# Patient Record
Sex: MALE | Race: WHITE | Employment: OTHER | URBAN - METROPOLITAN AREA
[De-identification: names, ages, dates, MRNs, and addresses within clinical notes are randomized per-mention and may not be internally consistent; named-entity substitution may affect disease eponyms.]

---

## 2019-03-27 ENCOUNTER — OFFICE VISIT (OUTPATIENT)
Dept: ORTHOPEDIC SURGERY | Age: 84
End: 2019-03-27
Payer: MEDICARE

## 2019-03-27 VITALS — WEIGHT: 220.02 LBS | BODY MASS INDEX: 30.8 KG/M2 | HEIGHT: 71 IN

## 2019-03-27 DIAGNOSIS — M25.562 PAIN IN BOTH KNEES, UNSPECIFIED CHRONICITY: Primary | ICD-10-CM

## 2019-03-27 DIAGNOSIS — M25.561 PAIN IN BOTH KNEES, UNSPECIFIED CHRONICITY: Primary | ICD-10-CM

## 2019-03-27 PROCEDURE — 99204 OFFICE O/P NEW MOD 45 MIN: CPT | Performed by: ORTHOPAEDIC SURGERY

## 2019-03-27 RX ORDER — LISINOPRIL 20 MG/1
20 TABLET ORAL 2 TIMES DAILY
COMMUNITY
Start: 2019-02-18

## 2019-03-27 RX ORDER — ATORVASTATIN CALCIUM 10 MG/1
10 TABLET, FILM COATED ORAL DAILY
COMMUNITY
Start: 2019-01-15

## 2019-04-01 NOTE — PROGRESS NOTES
MD Pj Nicole, [place]         Orthopaedic Surgery and Sports Medicine      Patient Name: Tricia Mora  YOB: 1934   Patient's PCP is Jolynn Diggs    SUBJECTIVE  Chief Complaint:  Knee Pain (both knees: pain in the knee joints, some pain travelling up his thighs)      History of Present Illness:  Tricia Mora is a 80 y.o. male here regarding bilateral knee pain. The patient is currently ambulating the assistance of a walker. The pain began many years ago. There was not a history of injury. History of swelling: Yes  History of \"giving way\": No  History of instability: Yes  History of popping and/or catching: Yes    The pain is located global.   The patient describes the symptoms as aching. He rates pain at 5/10. Symptoms improve with rest.   The symptoms are made worse with activity. Sleep pattern is affected by the chief complaint: No    The patient has not had PT. The patient has not had an injection. The patient has taken NSAIDs. The patient is not working. Pain Assessment:  Pain Assessment  Location of Pain: Knee  Location Modifiers: Right, Left  Severity of Pain: 0(pain is worse on different occassion)  Quality of Pain: Throbbing, Sharp, Aching, Locking  Duration of Pain: Persistent  Frequency of Pain: Intermittent  Aggravating Factors: Standing, Walking, Stairs, Exercise  Relieving Factors: Exercise    Past Medical History:  No past medical history on file. Past Surgical History:  No past surgical history on file.     Allergies:  No Known Allergies    Medications:  Current Outpatient Medications   Medication Sig Dispense Refill    atorvastatin (LIPITOR) 10 MG tablet Take 10 mg by mouth daily      lisinopril (PRINIVIL;ZESTRIL) 20 MG tablet Take 20 mg by mouth 2 times daily       No current facility-administered medications for this of motion is entirely secondary to his degenerative joint disease. Left knee Special Tests:  Lachman test: negative       Anterior drawer: negative       Posterior drawer: negative       Edi's test: not tested       Varus laxity at 30 degrees: negative       Valgus laxity at 30 degrees: positive    Strength: mild gross motor weakness noted. Motor exam of the lower extremities show quadriceps, hamstrings, foot dorsiflexion and plantarflexion grossly intact. Neurologic & vascular: Sensation to both feet is grossly intact to light touch. The bilateral lower extremities are warm and well-perfused with brisk capillary refill. Additional Examinations:  Right Lower Extremity: Examination of the right lower extremity does not show any tenderness. As stated above his right knee range of motion is certainly better than the left. He does have obvious degenerative joint disease of the right knee as well just not quite as severe. His alignment is a little bit better than the left and there is no effusion. His distal neurologic and circulatory status is normal.    DIAGNOSTICS  Xrays obtained in office today: Yes  Xrays reviewed today: Yes  4 views of the left knee in 2 views of his right knee  Fracture: No  Dislocation: No  Knee joint arthritis: severe, bilateral knees  Medial compartment: severe, bilateral  Lateral compartment: severe bilateral  Patellofemoral compartment: severe bilateral  Patellar tilt:No  Varus deformity: Yes  Valgus deformity:No           ASSESSMENT (Medical Decision Making)    Frandy Bellamy is a 80 y.o. male with the following diagnosis: bilateral severe degenerative osteoarthritis of both knees      ICD-10-CM    1.  Pain in both knees, unspecified chronicity M25.561 XR KNEE RIGHT (MIN 4 VIEWS)    M25.562 XR KNEE LEFT (1-2 VIEWS)       His overall course is unchanged despite conservative treatment      PLAN (Medical Decision Making)  Office Procedures:  Orders Placed This Encounter Procedures    XR KNEE RIGHT (MIN 4 VIEWS)     Standing Status:   Future     Number of Occurrences:   1     Standing Expiration Date:   3/27/2020    XR KNEE LEFT (1-2 VIEWS)     Standing Status:   Future     Number of Occurrences:   1     Standing Expiration Date:   3/27/2020       Treatment Plan:    I discussed the diagnosis and treatment options with Raul Cisneros today. Today for discussion about his degenerative osteoarthritis. He's not interested in any type of surgical intervention because of previous medical problems. I think that's appropriate decision. He has pretty severe degenerative osteoarthritis in both knees. Currently seems to have more symptoms in the left but radiographically probably a little worse on the right. He currently ambulates with the assistance of a walker. I discussed with him the importance of using the walker. Because of his arthritic conditions, a generalized weakness, and his gait, he is at high risk for falling and therefore must always use his walker. I spoke specifically about exercise and trying to walk more frequently to essentially practice his gait and practices ability to walk and to maintain his strength if not even improve upon his strength. I don't believe any specific medications injections or surgical intervention is indicated. Patient was asked to follow-up as needed  Non-steroidal anti-inflammatories medications (NSAIDs) can be used to assist with pain control and to reduce inflammatory changes. These medications may be over-the-counter or prescribed. We discussed taking the NSAID properly and the precautions. The patient understands that this medication may potentially interfere with other medications. Patient was also instructed to immediately discontinue the medication is there is any possible complication.       Raul Cisneros was instructed to call the office if his symptoms worsen or if new symptoms appear prior to the next scheduled visit. He is specifically instructed to contact the office between now and schedule appointment if he has concerns related to his condition or if he needs assistance in scheduling any above tests. He is welcome to call for an appointment sooner if he has any additional concerns or questions. This dictation was performed with a verbal recognition program (DRAGON) and it was checked for errors. It is possible that there are still dictated errors within this office note. If so, please bring any errors to my attention for an addendum. All efforts were made to ensure that this office note is accurate.

## 2023-12-20 NOTE — PROGRESS NOTES
to be held pre-operatively.  Will follow [facility] result's     Follow up with Dr. Young.  Follow up for procedure    Harvey Marie MD  Surgery Attending

## 2023-12-21 PROBLEM — M19.90 OSTEOARTHROSIS: Status: ACTIVE | Noted: 2023-12-21

## 2023-12-21 PROBLEM — I10 HTN (HYPERTENSION), BENIGN: Status: ACTIVE | Noted: 2022-07-12

## 2023-12-21 PROBLEM — E78.2 MIXED HYPERLIPIDEMIA: Status: ACTIVE | Noted: 2022-07-12

## 2023-12-21 PROBLEM — I71.40 AAA (ABDOMINAL AORTIC ANEURYSM) WITHOUT RUPTURE (HCC): Status: ACTIVE | Noted: 2023-12-21

## 2023-12-21 PROBLEM — I48.20 CHRONIC ATRIAL FIBRILLATION (HCC): Status: ACTIVE | Noted: 2023-02-22

## 2023-12-21 PROBLEM — N17.9 ACUTE RENAL FAILURE SUPERIMPOSED ON STAGE 3A CHRONIC KIDNEY DISEASE (HCC): Status: ACTIVE | Noted: 2021-12-29

## 2023-12-21 PROBLEM — N18.30 STAGE 3 CHRONIC KIDNEY DISEASE (HCC): Status: ACTIVE | Noted: 2021-04-30

## 2023-12-21 PROBLEM — E11.21 DIABETIC NEPHROPATHY ASSOCIATED WITH TYPE 2 DIABETES MELLITUS (HCC): Status: ACTIVE | Noted: 2017-07-25

## 2023-12-21 PROBLEM — N18.31 ACUTE RENAL FAILURE SUPERIMPOSED ON STAGE 3A CHRONIC KIDNEY DISEASE (HCC): Status: ACTIVE | Noted: 2021-12-29

## 2023-12-21 PROBLEM — E08.21 DIABETES DUE TO UNDERLYING CONDITION W DIABETIC NEPHROPATHY (HCC): Status: ACTIVE | Noted: 2017-07-25

## 2023-12-21 PROBLEM — E11.9 TYPE 2 DIABETES, DIET CONTROLLED (HCC): Status: ACTIVE | Noted: 2017-07-25

## 2023-12-21 PROBLEM — R60.0 BILATERAL LOWER EXTREMITY EDEMA: Status: ACTIVE | Noted: 2017-09-15

## 2023-12-21 RX ORDER — VITAMIN B COMPLEX
1000 TABLET ORAL 2 TIMES DAILY
COMMUNITY

## 2023-12-21 RX ORDER — HYDROCHLOROTHIAZIDE 25 MG/1
25 TABLET ORAL EVERY MORNING
COMMUNITY
Start: 2023-08-11

## 2023-12-21 RX ORDER — AMLODIPINE BESYLATE 10 MG/1
1 TABLET ORAL DAILY
COMMUNITY
Start: 2023-10-26

## 2023-12-21 RX ORDER — APIXABAN 5 MG/1
5 TABLET, FILM COATED ORAL 2 TIMES DAILY
COMMUNITY

## 2023-12-21 RX ORDER — BIFIDOBACTERIUM LONGUM SUBSP. INFANTIS, AVOBENZONE, HOMOSALATE, OCTISALATE, OCTOCRYLENE, AND OXYBENZONE
KIT
COMMUNITY

## 2023-12-21 RX ORDER — VIT A/VIT C/VIT E/ZINC/COPPER 4296-226
1 CAPSULE ORAL 2 TIMES DAILY
COMMUNITY

## 2023-12-21 RX ORDER — LEVOTHYROXINE SODIUM 0.05 MG/1
50 TABLET ORAL DAILY
COMMUNITY
Start: 2023-09-08

## 2024-01-02 ENCOUNTER — TELEPHONE (OUTPATIENT)
Dept: SURGERY | Age: 89
End: 2024-01-02

## 2024-01-02 NOTE — TELEPHONE ENCOUNTER
Called Mingo read voicemail informing him his appointment is for a consultation and as long as he's feeling well it's OK to come for appointment.     While I was leaving message, Mingo answered phone but stated he was unable to hear me speaking. He stated he would listen to the message and call back.

## 2024-01-02 NOTE — TELEPHONE ENCOUNTER
Patent called in stating he is on an antibiotic for fluid on his lungs. He also take eliquis daily. He is take amoxicillin and will have 3 days left on his appointment on Friday. He wanted to call and give the office a heads up.

## 2024-01-02 NOTE — TELEPHONE ENCOUNTER
Mingo returned call and verified he will come to 10am appt on Friday.   Verbalized understanding that it is for consultation.

## 2024-01-04 RX ORDER — AMOXICILLIN AND CLAVULANATE POTASSIUM 875; 125 MG/1; MG/1
1 TABLET, FILM COATED ORAL 2 TIMES DAILY
COMMUNITY
Start: 2023-12-27

## 2024-01-05 ENCOUNTER — OFFICE VISIT (OUTPATIENT)
Dept: SURGERY | Age: 89
End: 2024-01-05

## 2024-01-05 VITALS
BODY MASS INDEX: 29.15 KG/M2 | TEMPERATURE: 99.4 F | HEART RATE: 61 BPM | SYSTOLIC BLOOD PRESSURE: 131 MMHG | WEIGHT: 208.2 LBS | HEIGHT: 71 IN | DIASTOLIC BLOOD PRESSURE: 68 MMHG

## 2024-01-05 DIAGNOSIS — D03.30 MELANOMA IN SITU OF FACE (HCC): Primary | ICD-10-CM

## 2024-01-29 ENCOUNTER — TELEPHONE (OUTPATIENT)
Dept: SURGERY | Age: 89
End: 2024-01-29

## 2024-01-29 NOTE — TELEPHONE ENCOUNTER
Patient called wanting to know if decision has been made on if Dr. Marie would be removing his melanoma in the office, or as a procedure in the hospital.    Please follow up with patient, [phone]

## 2024-02-06 ENCOUNTER — PROCEDURE VISIT (OUTPATIENT)
Dept: SURGERY | Age: 89
End: 2024-02-06
Payer: MEDICARE

## 2024-02-06 VITALS
TEMPERATURE: 98 F | SYSTOLIC BLOOD PRESSURE: 151 MMHG | WEIGHT: 204 LBS | HEART RATE: 80 BPM | DIASTOLIC BLOOD PRESSURE: 77 MMHG | HEIGHT: 71 IN | BODY MASS INDEX: 28.56 KG/M2

## 2024-02-06 DIAGNOSIS — D03.30 MELANOMA IN SITU OF FACE (HCC): Primary | ICD-10-CM

## 2024-02-06 DIAGNOSIS — D03.22 MELANOMA IN SITU OF EAR, LEFT (HCC): Primary | ICD-10-CM

## 2024-02-06 PROCEDURE — 11646 EXC F/E/E/N/L MAL+MRG >4 CM: CPT | Performed by: SURGERY

## 2024-02-07 PROBLEM — R60.0 LOCALIZED EDEMA: Status: ACTIVE | Noted: 2021-03-09

## 2024-02-07 PROBLEM — Z95.1 PRESENCE OF AORTOCORONARY BYPASS GRAFT: Status: ACTIVE | Noted: 2021-03-09

## 2024-02-07 PROBLEM — R26.89 OTHER ABNORMALITIES OF GAIT AND MOBILITY: Status: ACTIVE | Noted: 2021-03-10

## 2024-02-07 PROBLEM — E66.01 MORBID (SEVERE) OBESITY DUE TO EXCESS CALORIES (HCC): Status: ACTIVE | Noted: 2021-03-09

## 2024-02-07 PROBLEM — M62.81 MUSCLE WEAKNESS (GENERALIZED): Status: ACTIVE | Noted: 2021-03-10

## 2024-02-07 PROBLEM — J90 PLEURAL EFFUSION, RIGHT: Status: ACTIVE | Noted: 2024-01-11

## 2024-02-07 PROBLEM — M54.42 LUMBAGO WITH SCIATICA, LEFT SIDE: Status: ACTIVE | Noted: 2021-03-09

## 2024-02-07 PROBLEM — E78.00 PURE HYPERCHOLESTEROLEMIA, UNSPECIFIED: Status: ACTIVE | Noted: 2021-03-09

## 2024-02-07 PROBLEM — E03.9 HYPOTHYROIDISM, UNSPECIFIED: Status: ACTIVE | Noted: 2021-03-09

## 2024-02-07 PROBLEM — R06.02 SHORTNESS OF BREATH: Status: ACTIVE | Noted: 2024-01-31

## 2024-03-07 NOTE — PROGRESS NOTES
Mingo Johnson  12/13/1934    PREOPERATIVE DIAGNOSIS: Malignant Melanoma in situ left ear     POSTOPERATIVE DIAGNOSIS: Same     PROCEDURE PERFORMED: Wide local excision and closure    ATTENDING SURGEON: Harvey Marie MD      ANESTHESIA: Local.     BLOOD LOSS: 2 ml   COMPLICATIONS : None  DRAINS: None   SPECIMENS REMOVED: tumor with skin     INDICATIONS FOR OPERATION:  Discussed with patient about wide excision of the melanoma. The risks of surgery include infection, bleeding, and recurrence of the lesion were explained. Patient verbalized understanding of the risks and benefits and wishes to proceed.    DETAILS OF PROCEDURE: Patient was identified and positioned appropriately. Operative site is prepped and draped in a sterile manner. Local anesthesia infiltrated. Lesion and margin marked for the excision. The lesion was measuring 3.2 cm x 1.8 cm including involvement of ear. A margin of 0.5 cm was taken on all sides and it was converted into an eyeball shape to allow for the closure. Skin incised with scalpel. Incision deepened to subcutaneous tissues. Electrocautery used for further division of tissues. Skin along with subcutaneous fat until fascia excised and oriented with sutures. Specimen sent to pathology.   Hemostasis checked and achieved. Wound was left open as discussed with patient / family and dressing applied. The patient tolerated the procedure well. I was present for the entire procedure.    Harvey Marie MD  Surgical Oncologist